# Patient Record
Sex: FEMALE
[De-identification: names, ages, dates, MRNs, and addresses within clinical notes are randomized per-mention and may not be internally consistent; named-entity substitution may affect disease eponyms.]

---

## 2019-04-18 PROBLEM — Z00.00 ENCOUNTER FOR PREVENTIVE HEALTH EXAMINATION: Status: ACTIVE | Noted: 2019-04-18

## 2019-04-24 ENCOUNTER — APPOINTMENT (OUTPATIENT)
Dept: ORTHOPEDIC SURGERY | Facility: CLINIC | Age: 49
End: 2019-04-24
Payer: COMMERCIAL

## 2019-04-24 VITALS — BODY MASS INDEX: 34.21 KG/M2 | HEIGHT: 67 IN | WEIGHT: 218 LBS

## 2019-04-24 DIAGNOSIS — M23.91 UNSPECIFIED INTERNAL DERANGEMENT OF RIGHT KNEE: ICD-10-CM

## 2019-04-24 DIAGNOSIS — M17.11 UNILATERAL PRIMARY OSTEOARTHRITIS, RIGHT KNEE: ICD-10-CM

## 2019-04-24 PROCEDURE — 73562 X-RAY EXAM OF KNEE 3: CPT | Mod: 50

## 2019-04-24 PROCEDURE — 99215 OFFICE O/P EST HI 40 MIN: CPT

## 2019-04-24 RX ORDER — DICLOFENAC SODIUM 100 MG/1
100 TABLET, FILM COATED, EXTENDED RELEASE ORAL
Qty: 30 | Refills: 2 | Status: ACTIVE | COMMUNITY
Start: 2019-04-24 | End: 1900-01-01

## 2019-04-24 NOTE — PHYSICAL EXAM
[de-identified] : Right knee exam shows well-healed arthroscopic portal sites. There is patellofemoral crepitus. There is no joint line pain negative Frederic neurovascular exam is normal no instability full range of motion. There is no varicosities.\par \par Left knee exam shows mild swelling. There is patellofemoral crepitus. There is tenderness along the medial and lateral joint lines. There is a positive Frederic test. Neurovascular exam is normal. No evidence of instability. Range of motion is full. There is varicosities. [de-identified] : Bilateral knee x-ray shows mild medial joint space narrowing with mild spur formation. The left is worse than the right.

## 2019-04-24 NOTE — HISTORY OF PRESENT ILLNESS
[de-identified] : Has bilateral knee pain. The left is worse than the right. The arthroscopic surgery on her right knee helped her. She is having some clicking and popping and pain in her left knee.

## 2019-04-24 NOTE — DISCUSSION/SUMMARY
[Medication Risks Reviewed] : Medication risks reviewed [de-identified] : The patient did very well with her right knee arthroscopy. She may have a left knee meniscus tear. We will make arrangements for the MRI of the left knee. I will call her with the results. I'm also going to send a prescription for an anti-inflammatory to the pharmacy for her. Followup done by telephone with the MRI results.

## 2019-04-24 NOTE — REVIEW OF SYSTEMS
[Arthralgia] : arthralgia [Joint Pain] : joint pain [Negative] : Heme/Lymph [Joint Stiffness] : no joint stiffness

## 2019-07-08 ENCOUNTER — APPOINTMENT (OUTPATIENT)
Dept: ORTHOPEDIC SURGERY | Facility: CLINIC | Age: 49
End: 2019-07-08

## 2019-08-07 ENCOUNTER — APPOINTMENT (OUTPATIENT)
Dept: ORTHOPEDIC SURGERY | Facility: CLINIC | Age: 49
End: 2019-08-07
Payer: COMMERCIAL

## 2019-08-07 DIAGNOSIS — M23.92 UNSPECIFIED INTERNAL DERANGEMENT OF LEFT KNEE: ICD-10-CM

## 2019-08-07 DIAGNOSIS — M17.12 UNILATERAL PRIMARY OSTEOARTHRITIS, LEFT KNEE: ICD-10-CM

## 2019-08-07 PROCEDURE — 20610 DRAIN/INJ JOINT/BURSA W/O US: CPT | Mod: LT

## 2019-08-07 PROCEDURE — 99213 OFFICE O/P EST LOW 20 MIN: CPT | Mod: 25

## 2019-08-07 NOTE — PHYSICAL EXAM
[de-identified] : Left knee shows no warmth or swelling there is tenderness laterally there is tenderness medially negative Frederic mild crepitus no instability neurovascular exam is normal

## 2019-08-07 NOTE — HISTORY OF PRESENT ILLNESS
[de-identified] : Left knee pain persists despite home exercise and oral medication. She would like to try an injection.

## 2019-09-04 ENCOUNTER — APPOINTMENT (OUTPATIENT)
Dept: ORTHOPEDIC SURGERY | Facility: CLINIC | Age: 49
End: 2019-09-04

## 2019-11-26 ENCOUNTER — RX RENEWAL (OUTPATIENT)
Age: 49
End: 2019-11-26

## 2019-11-26 RX ORDER — DICLOFENAC SODIUM 100 MG/1
100 TABLET, FILM COATED, EXTENDED RELEASE ORAL
Qty: 30 | Refills: 2 | Status: ACTIVE | COMMUNITY
Start: 2019-11-26 | End: 1900-01-01

## 2019-12-02 ENCOUNTER — APPOINTMENT (OUTPATIENT)
Dept: ORTHOPEDIC SURGERY | Facility: CLINIC | Age: 49
End: 2019-12-02